# Patient Record
Sex: MALE | Race: WHITE
[De-identification: names, ages, dates, MRNs, and addresses within clinical notes are randomized per-mention and may not be internally consistent; named-entity substitution may affect disease eponyms.]

---

## 2019-06-27 ENCOUNTER — HOSPITAL ENCOUNTER (OUTPATIENT)
Dept: HOSPITAL 53 - M SMT | Age: 69
End: 2019-06-27
Attending: NURSE PRACTITIONER
Payer: MEDICARE

## 2019-06-27 DIAGNOSIS — R31.0: Primary | ICD-10-CM

## 2019-06-27 LAB
APPEARANCE UR: CLEAR
BACTERIA UR QL AUTO: NEGATIVE
BILIRUB UR QL STRIP.AUTO: NEGATIVE
GLUCOSE UR QL STRIP.AUTO: NEGATIVE MG/DL
HGB UR QL STRIP.AUTO: (no result)
KETONES UR QL STRIP.AUTO: NEGATIVE MG/DL
LEUKOCYTE ESTERASE UR QL STRIP.AUTO: NEGATIVE
MUCOUS THREADS URNS QL MICRO: (no result)
NITRITE UR QL STRIP.AUTO: NEGATIVE
PH UR STRIP.AUTO: 7 UNITS (ref 5–9)
PROT UR QL STRIP.AUTO: NEGATIVE MG/DL
RBC # UR AUTO: 23 /HPF (ref 0–3)
SP GR UR STRIP.AUTO: 1.01 (ref 1–1.03)
SQUAMOUS #/AREA URNS AUTO: 0 /HPF (ref 0–6)
UROBILINOGEN UR QL STRIP.AUTO: 0.2 MG/DL (ref 0–2)
WBC #/AREA URNS AUTO: 4 /HPF (ref 0–3)

## 2019-06-27 PROCEDURE — 87086 URINE CULTURE/COLONY COUNT: CPT

## 2019-06-27 PROCEDURE — 81001 URINALYSIS AUTO W/SCOPE: CPT

## 2019-06-27 PROCEDURE — 88108 CYTOPATH CONCENTRATE TECH: CPT

## 2019-07-11 ENCOUNTER — HOSPITAL ENCOUNTER (OUTPATIENT)
Dept: HOSPITAL 53 - M SMT | Age: 69
End: 2019-07-11
Attending: UROLOGY
Payer: MEDICARE

## 2019-07-11 DIAGNOSIS — R31.0: Primary | ICD-10-CM

## 2019-08-09 ENCOUNTER — HOSPITAL ENCOUNTER (OUTPATIENT)
Dept: HOSPITAL 53 - M LAB LCGH | Age: 69
End: 2019-08-09
Attending: UROLOGY
Payer: MEDICARE

## 2019-08-09 DIAGNOSIS — R31.0: Primary | ICD-10-CM

## 2019-08-09 DIAGNOSIS — N21.0: ICD-10-CM

## 2019-08-19 ENCOUNTER — HOSPITAL ENCOUNTER (OUTPATIENT)
Dept: HOSPITAL 53 - M SDC | Age: 69
Discharge: HOME | End: 2019-08-19
Attending: UROLOGY
Payer: MEDICARE

## 2019-08-19 VITALS — DIASTOLIC BLOOD PRESSURE: 81 MMHG | SYSTOLIC BLOOD PRESSURE: 123 MMHG

## 2019-08-19 VITALS — BODY MASS INDEX: 16.67 KG/M2 | HEIGHT: 68 IN | WEIGHT: 110 LBS

## 2019-08-19 DIAGNOSIS — J44.9: ICD-10-CM

## 2019-08-19 DIAGNOSIS — Z79.51: ICD-10-CM

## 2019-08-19 DIAGNOSIS — Z99.81: ICD-10-CM

## 2019-08-19 DIAGNOSIS — Z85.46: ICD-10-CM

## 2019-08-19 DIAGNOSIS — F17.210: ICD-10-CM

## 2019-08-19 DIAGNOSIS — Z79.899: ICD-10-CM

## 2019-08-19 DIAGNOSIS — N21.0: Primary | ICD-10-CM

## 2019-08-19 DIAGNOSIS — Z79.82: ICD-10-CM

## 2019-08-19 PROCEDURE — 82360 CALCULUS ASSAY QUANT: CPT

## 2019-08-19 PROCEDURE — 88300 SURGICAL PATH GROSS: CPT

## 2019-08-19 PROCEDURE — 52317 REMOVE BLADDER STONE: CPT

## 2019-08-21 NOTE — RO
DATE OF PROCEDURE:  08/19/2019

 

PREPROCEDURE DIAGNOSIS:  Bladder stone, possible bladder tumor.

 

POSTPROCEDURE DIAGNOSIS:  Bladder stones.

 

PROCEDURE:  Cystoscopy, laser cystolitholapaxy.

 

SURGEON:  Emir Sales MD

 

ASSISTANT:  None.

 

ANESTHESIA:  General.

 

OPERATIVE INDICATIONS:  This is a 69-year-old male with a history of radical

prostatectomy several years ago who has been having gross hematuria.  On a recent

office cystoscopy, he was found to have large bladder stone within the area of

the bladder neck as well as in an area on the anterolateral wall with mild

suspicion for possible tumor.  He was brought to the operating room today to

investigate this and to treat the stones.

 

DESCRIPTION OF PROCEDURE:  The patient was brought to the operating room and

general anesthesia was induced.  Prophylactic antibiotics were infused.  He was

then placed in dorsal lithotomy position and prepped and draped in the usual

sterile fashion.

 

A rigid cystoscope was inserted through the meatus and advanced into the bladder.

The bladder was then thoroughly examined with both the 30 and 70 degree lenses,

and of note, no bladder tumors were seen.  There were large stones attached

anteriorly in the area of the bladder neck raising suspicion that the stones grew

off of either a stitch or some other foreign body at the time of his

prostatectomy.  Once again, no tumors were seen.  I then utilized a 550 micron

laser fiber to fragment the stones off of the bladder neck and off the anterior

wall and then to fragment the stones into small pieces.  I kept doing this until

the fragments were small enough to remove using an Ellik evacuator.  Once all of

the fragments were removed, I confirmed once again that there were no tumors and

no additional stones remaining.  There was good hemostasis.  This marked the

conclusion of the procedure.

 

The scope was then removed and an 18 Portuguese Callejas catheter was inserted into the

bladder and the balloon was filled with 10 mL of sterile water.  The catheter was

connected to gravity drainage and this marked the conclusion of the procedure.

The patient as taken out of the dorsal lithotomy position, awakened from

anesthesia and transported to the recovery room in stable condition.

 

ESTIMATED BLOOD LOSS:  5 mL.

COMPLICATIONS:  None.

 

SPECIMENS:  Bladder stone fragments.

 

PLAN:  The patient will followup in the clinic in a few days for catheter

removal.

## 2019-08-23 LAB
AMM URATE MFR STONE: (no result) %
CA H2 PHOS DIHYD MFR STONE: 25 %
CALCIUM OXALATE DIHYDRATE MFR STONE IR: 25 %
CELL MATERIAL STONE EST-MCNT: (no result) %
CHOLEST SERPL-MCNC: (no result) MG/DL
COM MFR STONE: 30 %
NA URATE MFR STONE IR: (no result) %
STONE ANALYSIS-IMP: (no result)
URATE DIHYD MFR STONE: (no result) %
URATE SERPL-MCNC: (no result) MG/DL

## 2021-07-08 ENCOUNTER — HOSPITAL ENCOUNTER (OUTPATIENT)
Dept: HOSPITAL 53 - M SMT | Age: 71
End: 2021-07-08
Attending: NURSE PRACTITIONER
Payer: MEDICARE

## 2021-07-08 DIAGNOSIS — N21.0: Primary | ICD-10-CM

## 2021-07-08 PROCEDURE — 82365 CALCULUS SPECTROSCOPY: CPT

## 2021-07-19 LAB
AMM URATE MFR STONE: (no result) %
CA H2 PHOS DIHYD MFR STONE: 60 %
CALCIUM OXALATE DIHYDRATE MFR STONE IR: (no result) %
CELL MATERIAL STONE EST-MCNT: (no result) %
CHOLEST SERPL-MCNC: (no result) MG/DL
COM MFR STONE: 30 %
NA URATE MFR STONE IR: (no result) %
STONE ANALYSIS-IMP: (no result)
URATE DIHYD MFR STONE: (no result) %
URATE SERPL-MCNC: (no result) MG/DL